# Patient Record
Sex: FEMALE | Race: OTHER | NOT HISPANIC OR LATINO | URBAN - METROPOLITAN AREA
[De-identification: names, ages, dates, MRNs, and addresses within clinical notes are randomized per-mention and may not be internally consistent; named-entity substitution may affect disease eponyms.]

---

## 2025-03-18 ENCOUNTER — OFFICE VISIT (OUTPATIENT)
Dept: URGENT CARE | Age: 25
End: 2025-03-18
Payer: MEDICAID

## 2025-03-18 VITALS
BODY MASS INDEX: 33.84 KG/M2 | HEART RATE: 88 BPM | SYSTOLIC BLOOD PRESSURE: 112 MMHG | RESPIRATION RATE: 16 BRPM | DIASTOLIC BLOOD PRESSURE: 77 MMHG | WEIGHT: 185 LBS | TEMPERATURE: 97.6 F | OXYGEN SATURATION: 98 %

## 2025-03-18 DIAGNOSIS — R11.11 VOMITING WITHOUT NAUSEA, UNSPECIFIED VOMITING TYPE: ICD-10-CM

## 2025-03-18 DIAGNOSIS — A08.11 ACUTE GASTROENTEROPATHY DUE TO NOROVIRUS: Primary | ICD-10-CM

## 2025-03-18 PROBLEM — E73.9 LACTOSE INTOLERANCE: Status: ACTIVE | Noted: 2025-03-18

## 2025-03-18 LAB
POC APPEARANCE, URINE: ABNORMAL
POC BILIRUBIN, URINE: NEGATIVE
POC BLOOD, URINE: ABNORMAL
POC COLOR, URINE: ABNORMAL
POC CORONAVIRUS SARS-COV-2 PCR: NEGATIVE
POC GLUCOSE, URINE: NEGATIVE MG/DL
POC HUMAN RHINOVIRUS PCR: NEGATIVE
POC INFLUENZA A VIRUS PCR: NEGATIVE
POC INFLUENZA B VIRUS PCR: NEGATIVE
POC KETONES, URINE: NEGATIVE MG/DL
POC LEUKOCYTES, URINE: NEGATIVE
POC NITRITE,URINE: NEGATIVE
POC PH, URINE: 6 PH
POC PROTEIN, URINE: ABNORMAL MG/DL
POC RESPIRATORY SYNCYTIAL VIRUS PCR: NEGATIVE
POC SPECIFIC GRAVITY, URINE: >=1.03
POC UROBILINOGEN, URINE: 0.2 EU/DL
PREGNANCY TEST URINE, POC: NEGATIVE

## 2025-03-18 PROCEDURE — 1036F TOBACCO NON-USER: CPT

## 2025-03-18 PROCEDURE — 99204 OFFICE O/P NEW MOD 45 MIN: CPT

## 2025-03-18 PROCEDURE — 81025 URINE PREGNANCY TEST: CPT

## 2025-03-18 PROCEDURE — 87631 RESP VIRUS 3-5 TARGETS: CPT

## 2025-03-18 PROCEDURE — 81003 URINALYSIS AUTO W/O SCOPE: CPT

## 2025-03-18 RX ORDER — ONDANSETRON 4 MG/1
4 TABLET, ORALLY DISINTEGRATING ORAL EVERY 8 HOURS PRN
Qty: 15 TABLET | Refills: 0 | Status: SHIPPED | OUTPATIENT
Start: 2025-03-18 | End: 2025-03-23

## 2025-03-18 RX ORDER — ONDANSETRON 4 MG/1
4 TABLET, FILM COATED ORAL ONCE
Status: COMPLETED | OUTPATIENT
Start: 2025-03-18 | End: 2025-03-18

## 2025-03-18 RX ADMIN — ONDANSETRON 4 MG: 4 TABLET, FILM COATED ORAL at 12:20

## 2025-03-18 ASSESSMENT — ENCOUNTER SYMPTOMS
RECENT COUGH: 0
VOMITING: 1
DIAPHORESIS: 0
NAUSEA: 1
DIZZINESS: 0
ARTHRALGIAS: 0
FEVER: 0
POLYDIPSIA: 0
MYALGIAS: 0
LIGHT-HEADEDNESS: 0
POLYPHAGIA: 0
HEADACHES: 0
CONSTIPATION: 0
DIARRHEA: 1
CHILLS: 1
ABDOMINAL PAIN: 0

## 2025-03-18 NOTE — LETTER
March 18, 2025     Patient: Petros Pinedo   YOB: 2000   Date of Visit: 3/18/2025       To Whom It May Concern:    Petros Pinedo was seen in my clinic on 3/18/2025 at 1:10 pm. Please excuse Petros for her absence from work on this day to make the appointment. May return to work on 03/21/2025.     If you have any questions or concerns, please don't hesitate to call.         Sincerely,         IRENE Augustin-CNP        CC: No Recipients

## 2025-03-18 NOTE — PROGRESS NOTES
Subjective   Patient ID: Petros Pinedo is a 24 y.o. female. They present today with a chief complaint of Diarrhea, Vomiting, and rash face.    History of Present Illness  24 year old female present with complaint of nausea, vomiting, diarrhea, chills, and lower abd cramping. Started menses yesterday. Has been taking OTC nausea chewable with no relief. Report her son also has loose stools. 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 Has not eaten any solid foods today, but able to hold down liquids in small amounts.       Diarrhea  Quality:  Watery  Severity:  Moderate  Onset quality:  Sudden  Number of episodes:  5-6  Duration:  1 day  Timing:  Intermittent  Progression:  Unchanged  Relieved by:  Nothing  Worsened by:  Liquids  Ineffective treatments:  None tried  Associated symptoms: chills and vomiting    Associated symptoms: no abdominal pain, no arthralgias, no recent cough, no diaphoresis, no fever, no headaches, no myalgias and no URI    Risk factors: sick contacts    Risk factors: no recent antibiotic use, no suspicious food intake and no travel to endemic areas        Past Medical History  Allergies as of 03/18/2025    (No Known Allergies)       (Not in a hospital admission)       Past Medical History:   Diagnosis Date    Other specified health status     No pertinent past medical history       Past Surgical History:   Procedure Laterality Date    OTHER SURGICAL HISTORY  11/26/2021    Oral surgery        reports that she has never smoked. She has never used smokeless tobacco. She reports that she does not use drugs.    Review of Systems  Review of Systems   Constitutional:  Positive for chills. Negative for diaphoresis and fever.   Gastrointestinal:  Positive for diarrhea, nausea and vomiting. Negative for abdominal pain and constipation.   Endocrine: Negative for polydipsia, polyphagia and polyuria.   Musculoskeletal:  Negative for arthralgias and myalgias.   Neurological:  Negative for  dizziness, light-headedness and headaches.         Objective    Vitals:    03/18/25 1137 03/18/25 1218   BP: 112/77    Pulse: (!) 116 88   Resp: 16    Temp: 36.4 °C (97.6 °F)    SpO2: 98%    Weight: 83.9 kg (185 lb)      No LMP recorded.    Physical Exam  Vitals and nursing note reviewed.   Constitutional:       General: She is not in acute distress.     Appearance: Normal appearance. She is normal weight. She is ill-appearing. She is not toxic-appearing.   HENT:      Head: Normocephalic.      Nose: Nose normal.      Mouth/Throat:      Mouth: Mucous membranes are moist.      Pharynx: Oropharynx is clear. No posterior oropharyngeal erythema.   Eyes:      Conjunctiva/sclera: Conjunctivae normal.      Pupils: Pupils are equal, round, and reactive to light.   Cardiovascular:      Rate and Rhythm: Normal rate and regular rhythm.      Pulses: Normal pulses.      Heart sounds: Normal heart sounds.   Pulmonary:      Effort: Pulmonary effort is normal. No respiratory distress.      Breath sounds: Normal breath sounds. No wheezing.   Abdominal:      General: Abdomen is flat. Bowel sounds are increased.      Palpations: Abdomen is soft. There is no hepatomegaly or splenomegaly.      Tenderness: There is generalized abdominal tenderness. There is no right CVA tenderness, left CVA tenderness, guarding or rebound. Negative signs include Kirby's sign, Rovsing's sign, McBurney's sign, psoas sign and obturator sign.      Hernia: No hernia is present.      Comments: Pain reproduced with pt lifting head while supine   Musculoskeletal:         General: Normal range of motion.      Cervical back: Normal range of motion and neck supple.   Skin:     General: Skin is warm.   Neurological:      Mental Status: She is alert and oriented to person, place, and time.   Psychiatric:         Mood and Affect: Mood normal.         Behavior: Behavior normal.         Procedures    Point of Care Test & Imaging Results from this visit  Results for  orders placed or performed in visit on 03/18/25   POCT pregnancy, urine manually resulted   Result Value Ref Range    Preg Test, Ur Negative Negative   POCT UA Automated manually resulted   Result Value Ref Range    POC Color, Urine Dark Moon (A) Straw, Yellow, Light-Yellow    POC Appearance, Urine Cloudy (A) Clear    POC Glucose, Urine NEGATIVE NEGATIVE mg/dl    POC Bilirubin, Urine NEGATIVE NEGATIVE    POC Ketones, Urine NEGATIVE NEGATIVE mg/dl    POC Specific Gravity, Urine >=1.030 1.005 - 1.035    POC Blood, Urine LARGE (3+) (A) NEGATIVE    POC PH, Urine 6.0 No Reference Range Established PH    POC Protein, Urine 15 (1+) (A) NEGATIVE mg/dl    POC Urobilinogen, Urine 0.2 0.2, 1.0 EU/DL    Poc Nitrite, Urine NEGATIVE NEGATIVE    POC Leukocytes, Urine NEGATIVE NEGATIVE      No results found.    Diagnostic study results (if any) were reviewed by YESSI Augustin.    Assessment/Plan   Allergies, medications, history, and pertinent labs/EKGs/Imaging reviewed by YESSI Augustin.     Medical Decision Making  signs and symptoms consistent with viral gastroenteritis due to Norovirus. No evidence of acute abdomen pain or evidence of dehydration. Treatment is supportive measures and hydration. Patient is advised to return to clinic or present to ED if symptoms change, worsen, or do not begin to improve within 24-48 hours. Otherwise follow with PCP.  Patient verbalized understanding and agreeable with plan of care.       Orders and Diagnoses  Diagnoses and all orders for this visit:  Acute gastroenteropathy due to Norovirus  -     ondansetron ODT (Zofran-ODT) 4 mg disintegrating tablet; Dissolve 1 tablet (4 mg) in the mouth every 8 hours if needed for nausea or vomiting for up to 5 days.  Vomiting without nausea, unspecified vomiting type  -     POCT SPOTFIRE R/ST Panel Mini w/COVID ([facility]) manually resulted  -     POCT pregnancy, urine manually resulted  -     POCT UA Automated manually  resulted  -     ondansetron (Zofran) tablet 4 mg  -     ondansetron ODT (Zofran-ODT) 4 mg disintegrating tablet; Dissolve 1 tablet (4 mg) in the mouth every 8 hours if needed for nausea or vomiting for up to 5 days.      Medical Admin Record  Administrations This Visit       ondansetron (Zofran) tablet 4 mg       Admin Date  03/18/2025 Action  Given Dose  4 mg Route  oral Documented By  Magdalena Corona MA                    Patient disposition: Home    Electronically signed by YESSI Augustin  12:34 PM

## 2025-06-20 LAB
NON-UH HIE PROLACTIN: 7.9 NG/ML
NON-UH HIE TSH: 0.95 UIU/ML (ref 0.55–4.78)